# Patient Record
Sex: MALE | Race: WHITE | NOT HISPANIC OR LATINO | Employment: FULL TIME | URBAN - METROPOLITAN AREA
[De-identification: names, ages, dates, MRNs, and addresses within clinical notes are randomized per-mention and may not be internally consistent; named-entity substitution may affect disease eponyms.]

---

## 2018-02-21 ENCOUNTER — HISTORICAL (OUTPATIENT)
Dept: INTENSIVE CARE | Facility: HOSPITAL | Age: 28
End: 2018-02-21

## 2020-08-12 ENCOUNTER — HISTORICAL (OUTPATIENT)
Dept: ADMINISTRATIVE | Facility: HOSPITAL | Age: 30
End: 2020-08-12

## 2020-08-12 LAB
ABS NEUT (OLG): 2.41 X10(3)/MCL (ref 2.1–9.2)
ALBUMIN SERPL-MCNC: 3.7 GM/DL (ref 3.5–5)
ALBUMIN/GLOB SERPL: 1.5 RATIO (ref 1.1–2)
ALP SERPL-CCNC: 60 UNIT/L (ref 40–150)
ALT SERPL-CCNC: 23 UNIT/L (ref 0–55)
AST SERPL-CCNC: 21 UNIT/L (ref 5–34)
BASOPHILS # BLD AUTO: 0 X10(3)/MCL (ref 0–0.2)
BASOPHILS NFR BLD AUTO: 1 %
BILIRUB SERPL-MCNC: 0.8 MG/DL
BILIRUBIN DIRECT+TOT PNL SERPL-MCNC: 0.3 MG/DL (ref 0–0.5)
BILIRUBIN DIRECT+TOT PNL SERPL-MCNC: 0.5 MG/DL (ref 0–0.8)
BUN SERPL-MCNC: 14 MG/DL (ref 8.9–20.6)
CALCIUM SERPL-MCNC: 8.9 MG/DL (ref 8.4–10.2)
CHLORIDE SERPL-SCNC: 104 MMOL/L (ref 98–107)
CHOLEST SERPL-MCNC: 207 MG/DL
CHOLEST/HDLC SERPL: 4 {RATIO} (ref 0–5)
CO2 SERPL-SCNC: 26 MMOL/L (ref 22–29)
CREAT SERPL-MCNC: 0.87 MG/DL (ref 0.73–1.18)
EOSINOPHIL # BLD AUTO: 0.1 X10(3)/MCL (ref 0–0.9)
EOSINOPHIL NFR BLD AUTO: 1 %
ERYTHROCYTE [DISTWIDTH] IN BLOOD BY AUTOMATED COUNT: 12.8 % (ref 11.5–17)
EST. AVERAGE GLUCOSE BLD GHB EST-MCNC: 102.5 MG/DL
GLOBULIN SER-MCNC: 2.5 GM/DL (ref 2.4–3.5)
GLUCOSE SERPL-MCNC: 88 MG/DL (ref 74–100)
HBA1C MFR BLD: 5.2 %
HCT VFR BLD AUTO: 42.1 % (ref 42–52)
HDLC SERPL-MCNC: 51 MG/DL (ref 35–60)
HGB BLD-MCNC: 13.4 GM/DL (ref 14–18)
LDLC SERPL CALC-MCNC: 131 MG/DL (ref 50–140)
LYMPHOCYTES # BLD AUTO: 1.9 X10(3)/MCL (ref 0.6–4.6)
LYMPHOCYTES NFR BLD AUTO: 39 %
MCH RBC QN AUTO: 30 PG (ref 27–31)
MCHC RBC AUTO-ENTMCNC: 31.8 GM/DL (ref 33–36)
MCV RBC AUTO: 94.4 FL (ref 80–94)
MONOCYTES # BLD AUTO: 0.4 X10(3)/MCL (ref 0.1–1.3)
MONOCYTES NFR BLD AUTO: 9 %
NEUTROPHILS # BLD AUTO: 2.41 X10(3)/MCL (ref 2.1–9.2)
NEUTROPHILS NFR BLD AUTO: 50 %
PLATELET # BLD AUTO: 212 X10(3)/MCL (ref 130–400)
PMV BLD AUTO: 10.1 FL (ref 9.4–12.4)
POTASSIUM SERPL-SCNC: 4.1 MMOL/L (ref 3.5–5.1)
PROT SERPL-MCNC: 6.2 GM/DL (ref 6.4–8.3)
RBC # BLD AUTO: 4.46 X10(6)/MCL (ref 4.7–6.1)
SODIUM SERPL-SCNC: 140 MMOL/L (ref 136–145)
TRIGL SERPL-MCNC: 126 MG/DL (ref 34–140)
TSH SERPL-ACNC: 1.92 UIU/ML (ref 0.35–4.94)
VLDLC SERPL CALC-MCNC: 25 MG/DL
WBC # SPEC AUTO: 4.9 X10(3)/MCL (ref 4.5–11.5)

## 2022-03-07 ENCOUNTER — HISTORICAL (OUTPATIENT)
Dept: ADMINISTRATIVE | Facility: HOSPITAL | Age: 32
End: 2022-03-07

## 2022-03-07 LAB
ABS NEUT (OLG): 2.89 (ref 2.1–9.2)
ALBUMIN SERPL-MCNC: 4.4 G/DL (ref 3.5–5)
ALBUMIN/GLOB SERPL: 1.6 {RATIO} (ref 1.1–2)
ALP SERPL-CCNC: 89 U/L (ref 40–150)
ALT SERPL-CCNC: 26 U/L (ref 0–55)
AST SERPL-CCNC: 24 U/L (ref 5–34)
BASOPHILS # BLD AUTO: 0 10*3/UL (ref 0–0.2)
BASOPHILS NFR BLD AUTO: 0 %
BILIRUB SERPL-MCNC: 1 MG/DL
BILIRUBIN DIRECT+TOT PNL SERPL-MCNC: 0.3 (ref 0–0.5)
BILIRUBIN DIRECT+TOT PNL SERPL-MCNC: 0.7 (ref 0–0.8)
BUN SERPL-MCNC: 12.1 MG/DL (ref 8.9–20.6)
CALCIUM SERPL-MCNC: 9.4 MG/DL (ref 8.7–10.5)
CHLORIDE SERPL-SCNC: 108 MMOL/L (ref 98–107)
CHOLEST SERPL-MCNC: 214 MG/DL
CHOLEST/HDLC SERPL: 4 {RATIO} (ref 0–5)
CO2 SERPL-SCNC: 24 MMOL/L (ref 22–29)
CREAT SERPL-MCNC: 0.88 MG/DL (ref 0.73–1.18)
DEPRECATED CALCIDIOL+CALCIFEROL SERPL-MC: 34.6 NG/ML (ref 30–80)
EOSINOPHIL # BLD AUTO: 0 10*3/UL (ref 0–0.9)
EOSINOPHIL NFR BLD AUTO: 1 %
ERYTHROCYTE [DISTWIDTH] IN BLOOD BY AUTOMATED COUNT: 13.3 % (ref 11.5–17)
EST. AVERAGE GLUCOSE BLD GHB EST-MCNC: 102.5 MG/DL
GLOBULIN SER-MCNC: 2.7 G/DL (ref 2.4–3.5)
GLUCOSE SERPL-MCNC: 91 MG/DL (ref 74–100)
HBA1C MFR BLD: 5.2 %
HCT VFR BLD AUTO: 42.9 % (ref 42–52)
HDLC SERPL-MCNC: 57 MG/DL (ref 35–60)
HEMOLYSIS INTERF INDEX SERPL-ACNC: 9
HGB BLD-MCNC: 14 G/DL (ref 14–18)
ICTERIC INTERF INDEX SERPL-ACNC: 1
LDLC SERPL CALC-MCNC: 140 MG/DL (ref 50–140)
LIPEMIC INTERF INDEX SERPL-ACNC: 2
LYMPHOCYTES # BLD AUTO: 1.5 10*3/UL (ref 0.6–4.6)
LYMPHOCYTES NFR BLD AUTO: 31 %
MANUAL DIFF? (OHS): NO
MCH RBC QN AUTO: 30.6 PG (ref 27–31)
MCHC RBC AUTO-ENTMCNC: 32.6 G/DL (ref 33–36)
MCV RBC AUTO: 93.7 FL (ref 80–94)
MONOCYTES # BLD AUTO: 0.4 10*3/UL (ref 0.1–1.3)
MONOCYTES NFR BLD AUTO: 9 %
NEUTROPHILS # BLD AUTO: 2.89 10*3/UL (ref 2.1–9.2)
NEUTROPHILS NFR BLD AUTO: 59 %
PLATELET # BLD AUTO: 251 10*3/UL (ref 130–400)
PMV BLD AUTO: 10.4 FL (ref 9.4–12.4)
POTASSIUM SERPL-SCNC: 4.5 MMOL/L (ref 3.5–5.1)
PROT SERPL-MCNC: 7.1 G/DL (ref 6.4–8.3)
RBC # BLD AUTO: 4.58 10*6/UL (ref 4.7–6.1)
SODIUM SERPL-SCNC: 140 MMOL/L (ref 136–145)
TRIGL SERPL-MCNC: 84 MG/DL (ref 34–140)
TSH SERPL-ACNC: 1.38 M[IU]/L (ref 0.35–4.94)
VLDLC SERPL CALC-MCNC: 17 MG/DL
WBC # SPEC AUTO: 4.9 10*3/UL (ref 4.5–11.5)

## 2022-04-10 ENCOUNTER — HISTORICAL (OUTPATIENT)
Dept: ADMINISTRATIVE | Facility: HOSPITAL | Age: 32
End: 2022-04-10

## 2022-04-29 VITALS
SYSTOLIC BLOOD PRESSURE: 124 MMHG | WEIGHT: 299.19 LBS | DIASTOLIC BLOOD PRESSURE: 72 MMHG | HEIGHT: 71 IN | OXYGEN SATURATION: 98 % | BODY MASS INDEX: 41.89 KG/M2

## 2022-07-28 ENCOUNTER — OFFICE VISIT (OUTPATIENT)
Dept: INTERNAL MEDICINE | Facility: CLINIC | Age: 32
End: 2022-07-28
Payer: COMMERCIAL

## 2022-07-28 VITALS
BODY MASS INDEX: 37.56 KG/M2 | TEMPERATURE: 98 F | DIASTOLIC BLOOD PRESSURE: 74 MMHG | HEART RATE: 60 BPM | OXYGEN SATURATION: 97 % | WEIGHT: 262.38 LBS | HEIGHT: 70 IN | SYSTOLIC BLOOD PRESSURE: 120 MMHG

## 2022-07-28 DIAGNOSIS — S68.119S TRAUMATIC AMPUTATION OF FINGER, SEQUELA: ICD-10-CM

## 2022-07-28 DIAGNOSIS — E66.01 MORBID OBESITY: ICD-10-CM

## 2022-07-28 DIAGNOSIS — F41.8 MIXED ANXIETY DEPRESSIVE DISORDER: Primary | ICD-10-CM

## 2022-07-28 PROCEDURE — 1160F RVW MEDS BY RX/DR IN RCRD: CPT | Mod: CPTII,,, | Performed by: INTERNAL MEDICINE

## 2022-07-28 PROCEDURE — 3008F PR BODY MASS INDEX (BMI) DOCUMENTED: ICD-10-PCS | Mod: CPTII,,, | Performed by: INTERNAL MEDICINE

## 2022-07-28 PROCEDURE — 3078F DIAST BP <80 MM HG: CPT | Mod: CPTII,,, | Performed by: INTERNAL MEDICINE

## 2022-07-28 PROCEDURE — 99214 OFFICE O/P EST MOD 30 MIN: CPT | Mod: ,,, | Performed by: INTERNAL MEDICINE

## 2022-07-28 PROCEDURE — 1159F MED LIST DOCD IN RCRD: CPT | Mod: CPTII,,, | Performed by: INTERNAL MEDICINE

## 2022-07-28 PROCEDURE — 3008F BODY MASS INDEX DOCD: CPT | Mod: CPTII,,, | Performed by: INTERNAL MEDICINE

## 2022-07-28 PROCEDURE — 3074F SYST BP LT 130 MM HG: CPT | Mod: CPTII,,, | Performed by: INTERNAL MEDICINE

## 2022-07-28 PROCEDURE — 3074F PR MOST RECENT SYSTOLIC BLOOD PRESSURE < 130 MM HG: ICD-10-PCS | Mod: CPTII,,, | Performed by: INTERNAL MEDICINE

## 2022-07-28 PROCEDURE — 1160F PR REVIEW ALL MEDS BY PRESCRIBER/CLIN PHARMACIST DOCUMENTED: ICD-10-PCS | Mod: CPTII,,, | Performed by: INTERNAL MEDICINE

## 2022-07-28 PROCEDURE — 1159F PR MEDICATION LIST DOCUMENTED IN MEDICAL RECORD: ICD-10-PCS | Mod: CPTII,,, | Performed by: INTERNAL MEDICINE

## 2022-07-28 PROCEDURE — 99214 PR OFFICE/OUTPT VISIT, EST, LEVL IV, 30-39 MIN: ICD-10-PCS | Mod: ,,, | Performed by: INTERNAL MEDICINE

## 2022-07-28 PROCEDURE — 3078F PR MOST RECENT DIASTOLIC BLOOD PRESSURE < 80 MM HG: ICD-10-PCS | Mod: CPTII,,, | Performed by: INTERNAL MEDICINE

## 2022-07-28 RX ORDER — BUPROPION HYDROCHLORIDE 150 MG/1
150 TABLET ORAL DAILY
Qty: 30 TABLET | Refills: 11 | Status: SHIPPED | OUTPATIENT
Start: 2022-07-28 | End: 2022-07-28 | Stop reason: SDUPTHER

## 2022-07-28 RX ORDER — PROPRANOLOL HYDROCHLORIDE 10 MG/1
20 TABLET ORAL 3 TIMES DAILY PRN
Qty: 180 TABLET | Refills: 3 | Status: SHIPPED | OUTPATIENT
Start: 2022-07-28 | End: 2022-07-28 | Stop reason: SDUPTHER

## 2022-07-28 NOTE — ASSESSMENT & PLAN NOTE
-advised on importance of watching her carbohydrate intake and sugary food intake  -some form of hurting her exercise is encouraged at length  -hydration is emphasized

## 2022-07-28 NOTE — PROGRESS NOTES
Subjective:      Patient ID: Jose Simon is a 31 y.o. male.    Chief Complaint: Anxiety and Loss of Consciousness    Jose is a 31-year-old male here today with complaints of anxiety.     Medical comorbidities include left hand digit amputations, anxiety/depression, tobacco use, and obesity.  At our last visit advised to stop using energy drinks and the workup drains which are high in caffeine because of issues with palpitations.   Today he requested a visit since anxiety was getting uncontrolled.  Patient has been having difficulty falling asleep as well.  States he probably needs something to keep his anxiety under control and we discussed getting him started on Wellbutrin and he is willing to try.  Also propranolol to help with p.r.n. use    Wellness: 3/7/2022    The patient's Health Maintenance was reviewed and the following appears to be due at this time:   There are no preventive care reminders to display for this patient.     Past Medical History:  Past Medical History:   Diagnosis Date    Anxiety and depression     Fatigue     Snoring     Weight gain      Past Surgical History:   Procedure Laterality Date    left hand scar tissue revision      REVISION AMPUTATION OF FINGER      TONSILLECTOMY AND ADENOIDECTOMY      WISDOM TOOTH EXTRACTION       Review of patient's allergies indicates:   Allergen Reactions    Oxycodone-acetaminophen Itching     Social History     Socioeconomic History    Marital status: Single   Tobacco Use    Smoking status: Former Smoker    Smokeless tobacco: Current User     Types: Chew   Substance and Sexual Activity    Alcohol use: Yes     Alcohol/week: 4.0 standard drinks     Types: 4 Cans of beer per week    Drug use: Never    Sexual activity: Yes     Family History   Problem Relation Age of Onset    Depression Mother     Anxiety disorder Mother     Anxiety disorder Father     Cancer Maternal Grandmother     Cancer Paternal Grandfather        Review of Systems  "  Constitutional: Negative.         Alert, Oriented and in no acute distress    HENT: Negative for congestion, rhinorrhea, sinus pressure, sinus pain, sore throat and voice change.    Eyes: Negative for discharge and visual disturbance.   Respiratory: Negative for cough, chest tightness, shortness of breath and wheezing.    Cardiovascular: Negative for chest pain, palpitations and leg swelling.   Gastrointestinal: Negative for abdominal distention, abdominal pain, constipation, diarrhea, nausea and vomiting.   Endocrine: Negative for cold intolerance, heat intolerance, polydipsia and polyuria.   Genitourinary: Negative for dysuria, flank pain, frequency and hematuria.   Musculoskeletal: Negative for arthralgias, back pain, gait problem, joint swelling, myalgias and neck pain.   Skin: Negative for rash.   Allergic/Immunologic: Negative for immunocompromised state.   Neurological: Negative for dizziness, seizures, weakness and headaches.   Psychiatric/Behavioral: Negative for agitation and suicidal ideas. The patient is not nervous/anxious.        Objective:   /74 (BP Location: Left arm, Patient Position: Sitting, BP Method: Large (Manual))   Pulse 60   Temp 98.3 °F (36.8 °C) (Temporal)   Ht 5' 10" (1.778 m)   Wt 119 kg (262 lb 6.4 oz)   SpO2 97%   BMI 37.65 kg/m²     Physical Exam  Constitutional:       Appearance: Normal appearance.   HENT:      Head: Normocephalic and atraumatic.      Nose: Nose normal.      Mouth/Throat:      Mouth: Mucous membranes are moist.      Pharynx: Oropharynx is clear.   Eyes:      Extraocular Movements: Extraocular movements intact.      Pupils: Pupils are equal, round, and reactive to light.   Cardiovascular:      Rate and Rhythm: Normal rate and regular rhythm.      Pulses: Normal pulses.   Pulmonary:      Effort: Pulmonary effort is normal.      Breath sounds: Normal breath sounds.   Abdominal:      General: Bowel sounds are normal.      Palpations: Abdomen is soft. "   Musculoskeletal:         General: Normal range of motion.      Cervical back: Normal range of motion and neck supple.      Comments: Amputated fingers on the left   Skin:     General: Skin is warm.   Neurological:      General: No focal deficit present.      Mental Status: He is alert and oriented to person, place, and time. Mental status is at baseline.   Psychiatric:         Mood and Affect: Mood normal.       Assessment/ Plan:     Problem List Items Addressed This Visit        Psychiatric    Mixed anxiety depressive disorder - Primary     -initiating Wellbutrin 150 mg p.o. daily  -also propranolol 20 mg t.i.d. p.r.n. for anxiety episodes  -advised to avoid excessive caffeine intake  -RTC 3 months to see if he does well, advised on side effect profile and the fact that he needs to get the medication at least 2-3 weeks to see some difference              Endocrine    Morbid obesity     -advised on importance of watching her carbohydrate intake and sugary food intake  -some form of hurting her exercise is encouraged at length  -hydration is emphasized              Orthopedic    Amputation, finger, traumatic          No orders of the defined types were placed in this encounter.

## 2022-07-28 NOTE — ASSESSMENT & PLAN NOTE
-initiating Wellbutrin 150 mg p.o. daily  -also propranolol 20 mg t.i.d. p.r.n. for anxiety episodes  -advised to avoid excessive caffeine intake  -RTC 3 months to see if he does well, advised on side effect profile and the fact that he needs to get the medication at least 2-3 weeks to see some difference

## 2022-10-26 ENCOUNTER — TELEPHONE (OUTPATIENT)
Dept: ADMINISTRATIVE | Facility: HOSPITAL | Age: 32
End: 2022-10-26
Payer: COMMERCIAL

## 2022-11-01 ENCOUNTER — OFFICE VISIT (OUTPATIENT)
Dept: INTERNAL MEDICINE | Facility: CLINIC | Age: 32
End: 2022-11-01
Payer: COMMERCIAL

## 2022-11-01 VITALS
OXYGEN SATURATION: 97 % | SYSTOLIC BLOOD PRESSURE: 120 MMHG | TEMPERATURE: 99 F | HEART RATE: 65 BPM | DIASTOLIC BLOOD PRESSURE: 68 MMHG

## 2022-11-01 DIAGNOSIS — F32.A ANXIETY AND DEPRESSION: Primary | ICD-10-CM

## 2022-11-01 DIAGNOSIS — F41.9 ANXIETY AND DEPRESSION: Primary | ICD-10-CM

## 2022-11-01 DIAGNOSIS — E66.01 MORBID OBESITY: ICD-10-CM

## 2022-11-01 PROCEDURE — 99213 OFFICE O/P EST LOW 20 MIN: CPT | Mod: ,,, | Performed by: INTERNAL MEDICINE

## 2022-11-01 PROCEDURE — 3078F DIAST BP <80 MM HG: CPT | Mod: CPTII,,, | Performed by: INTERNAL MEDICINE

## 2022-11-01 PROCEDURE — 1159F MED LIST DOCD IN RCRD: CPT | Mod: CPTII,,, | Performed by: INTERNAL MEDICINE

## 2022-11-01 PROCEDURE — 3074F PR MOST RECENT SYSTOLIC BLOOD PRESSURE < 130 MM HG: ICD-10-PCS | Mod: CPTII,,, | Performed by: INTERNAL MEDICINE

## 2022-11-01 PROCEDURE — 1160F RVW MEDS BY RX/DR IN RCRD: CPT | Mod: CPTII,,, | Performed by: INTERNAL MEDICINE

## 2022-11-01 PROCEDURE — 99213 PR OFFICE/OUTPT VISIT, EST, LEVL III, 20-29 MIN: ICD-10-PCS | Mod: ,,, | Performed by: INTERNAL MEDICINE

## 2022-11-01 PROCEDURE — 1160F PR REVIEW ALL MEDS BY PRESCRIBER/CLIN PHARMACIST DOCUMENTED: ICD-10-PCS | Mod: CPTII,,, | Performed by: INTERNAL MEDICINE

## 2022-11-01 PROCEDURE — 3078F PR MOST RECENT DIASTOLIC BLOOD PRESSURE < 80 MM HG: ICD-10-PCS | Mod: CPTII,,, | Performed by: INTERNAL MEDICINE

## 2022-11-01 PROCEDURE — 1159F PR MEDICATION LIST DOCUMENTED IN MEDICAL RECORD: ICD-10-PCS | Mod: CPTII,,, | Performed by: INTERNAL MEDICINE

## 2022-11-01 PROCEDURE — 3074F SYST BP LT 130 MM HG: CPT | Mod: CPTII,,, | Performed by: INTERNAL MEDICINE

## 2022-11-01 NOTE — ASSESSMENT & PLAN NOTE
Doing better with the Rx Wellbutrin, continue, may consider reducing the dose in the future  Also on Rx propranolol on as needed basis, continue     Practice deep breathing or abdominal breathing exercises when anxiety occurs.  Exercise daily. Get sunlight daily.  Avoid caffeine, alcohol and stimulants.  Practice positive phrases and repeat throughout the day, along with yoga and relaxation techniques.  Set healthy boundaries, avoid people and conversations that increase stress.

## 2022-11-01 NOTE — PROGRESS NOTES
Subjective:      Patient ID: Jose Simon is a 31 y.o. male.    Chief Complaint: Follow-up (3 month)    Jose is a 31-year-old male here today for follow-up of anxiety.     Medical comorbidities include left hand digit amputations, anxiety/depression, tobacco use, and obesity.    Doing well with the Wellbutrin, sometimes skips doses and takes it on an as needed basis, advised that it needs to be taken daily.  If it is too strong, offered to cut back the dose.  For now patient will switch it to the nighttime and see how he does.  Uses propranolol in an as needed basis.          Wellness: 3/7/2022    The patient's Health Maintenance was reviewed and the following appears to be due at this time:   Health Maintenance Due   Topic Date Due    Influenza Vaccine (1) Never done        Past Medical History:  Past Medical History:   Diagnosis Date    Anxiety and depression     Fatigue     Snoring     Weight gain      Past Surgical History:   Procedure Laterality Date    left hand scar tissue revision      REVISION AMPUTATION OF FINGER      TONSILLECTOMY AND ADENOIDECTOMY      WISDOM TOOTH EXTRACTION       Review of patient's allergies indicates:   Allergen Reactions    Oxycodone-acetaminophen Itching     Social History     Socioeconomic History    Marital status: Single   Tobacco Use    Smoking status: Former    Smokeless tobacco: Current     Types: Chew   Substance and Sexual Activity    Alcohol use: Yes     Alcohol/week: 4.0 standard drinks     Types: 4 Drinks containing 0.5 oz of alcohol per week    Drug use: Not Currently     Types: Amphetamines, Marijuana    Sexual activity: Yes     Partners: Female     Family History   Problem Relation Age of Onset    Depression Mother     Anxiety disorder Mother     Anxiety disorder Father     Cancer Maternal Grandmother     Cancer Paternal Grandfather        Review of Systems    A comprehensive review of systems was performed and is negative except for that stated above  Objective:  "  /68 (BP Location: Left arm, Patient Position: Sitting, BP Method: Large (Manual))   Pulse 65   Temp 98.6 °F (37 °C) (Temporal)   Ht (P) 5' 10" (1.778 m)   Wt (P) 118.6 kg (261 lb 6.4 oz)   SpO2 97%   BMI (P) 37.51 kg/m²     Physical Exam  Constitutional:       Appearance: Normal appearance.   HENT:      Head: Normocephalic and atraumatic.      Nose: Nose normal.      Mouth/Throat:      Mouth: Mucous membranes are moist.      Pharynx: Oropharynx is clear.   Eyes:      Extraocular Movements: Extraocular movements intact.      Pupils: Pupils are equal, round, and reactive to light.   Cardiovascular:      Rate and Rhythm: Normal rate and regular rhythm.      Pulses: Normal pulses.   Pulmonary:      Effort: Pulmonary effort is normal.      Breath sounds: Normal breath sounds.   Abdominal:      General: Bowel sounds are normal.      Palpations: Abdomen is soft.   Musculoskeletal:         General: Normal range of motion.      Cervical back: Normal range of motion and neck supple.   Skin:     General: Skin is warm.   Neurological:      General: No focal deficit present.      Mental Status: He is alert and oriented to person, place, and time. Mental status is at baseline.   Psychiatric:         Mood and Affect: Mood normal.     Assessment/ Plan:   1. Anxiety and depression  Assessment & Plan:  Doing better with the Rx Wellbutrin, continue, may consider reducing the dose in the future  Also on Rx propranolol on as needed basis, continue     Practice deep breathing or abdominal breathing exercises when anxiety occurs.  Exercise daily. Get sunlight daily.  Avoid caffeine, alcohol and stimulants.  Practice positive phrases and repeat throughout the day, along with yoga and relaxation techniques.  Set healthy boundaries, avoid people and conversations that increase stress.        2. Morbid obesity  Assessment & Plan:  Body mass index is 37.51 kg/m² (pended).  Goal BMI <30.  Exercise 5 times a week for 30 minutes per " day.  Avoid soda, simple sugars, excessive rice, potatoes or bread. Limit fast foods and fried foods.  Choose complex carbs in moderation (example: green vegetables, beans, oatmeal). Eat plenty of fresh fruits and vegetables with lean meats daily.  Do not skip meals. Eat a balanced portion size.  Avoid fad diets. Consider permanent healthy life style changes.

## 2022-11-01 NOTE — ASSESSMENT & PLAN NOTE
Body mass index is 37.51 kg/m² (pended).  Goal BMI <30.  Exercise 5 times a week for 30 minutes per day.  Avoid soda, simple sugars, excessive rice, potatoes or bread. Limit fast foods and fried foods.  Choose complex carbs in moderation (example: green vegetables, beans, oatmeal). Eat plenty of fresh fruits and vegetables with lean meats daily.  Do not skip meals. Eat a balanced portion size.  Avoid fad diets. Consider permanent healthy life style changes.

## 2023-03-07 DIAGNOSIS — R53.83 FATIGUE, UNSPECIFIED TYPE: ICD-10-CM

## 2023-03-07 DIAGNOSIS — Z00.00 WELLNESS EXAMINATION: Primary | ICD-10-CM

## 2023-03-09 ENCOUNTER — TELEPHONE (OUTPATIENT)
Dept: ADMINISTRATIVE | Facility: HOSPITAL | Age: 33
End: 2023-03-09
Payer: COMMERCIAL

## 2023-03-09 NOTE — TELEPHONE ENCOUNTER
----- Message from Michelle Woo MA sent at 3/7/2023  7:43 AM CST -----  Regarding: Dr SHAHID EJAN Thursday 3-16-23       1. Are there any outstanding Labs, imaging or referrals in the patient's chart?      Wellness Appointment    Fasting wellness labs ordered and ready to do.     Last Wellness 3-7-22           2. . Has the patient been seen in an ER, urgent care clinic, or any other health care    provider since their last visit? If yes when and where?

## 2023-03-15 ENCOUNTER — TELEPHONE (OUTPATIENT)
Dept: INTERNAL MEDICINE | Facility: CLINIC | Age: 33
End: 2023-03-15
Payer: COMMERCIAL

## 2023-03-20 ENCOUNTER — LAB VISIT (OUTPATIENT)
Dept: LAB | Facility: HOSPITAL | Age: 33
End: 2023-03-20
Attending: INTERNAL MEDICINE
Payer: COMMERCIAL

## 2023-03-20 DIAGNOSIS — R53.83 FATIGUE, UNSPECIFIED TYPE: ICD-10-CM

## 2023-03-20 DIAGNOSIS — Z00.00 WELLNESS EXAMINATION: ICD-10-CM

## 2023-03-20 LAB
ALBUMIN SERPL-MCNC: 4.2 G/DL (ref 3.5–5)
ALBUMIN/GLOB SERPL: 1.6 RATIO (ref 1.1–2)
ALP SERPL-CCNC: 55 UNIT/L (ref 40–150)
ALT SERPL-CCNC: 18 UNIT/L (ref 0–55)
AST SERPL-CCNC: 20 UNIT/L (ref 5–34)
BASOPHILS # BLD AUTO: 0.03 X10(3)/MCL (ref 0–0.2)
BASOPHILS NFR BLD AUTO: 0.7 %
BILIRUBIN DIRECT+TOT PNL SERPL-MCNC: 0.7 MG/DL
BUN SERPL-MCNC: 14.1 MG/DL (ref 8.9–20.6)
CALCIUM SERPL-MCNC: 9.6 MG/DL (ref 8.4–10.2)
CHLORIDE SERPL-SCNC: 107 MMOL/L (ref 98–107)
CHOLEST SERPL-MCNC: 216 MG/DL
CHOLEST/HDLC SERPL: 4 {RATIO} (ref 0–5)
CO2 SERPL-SCNC: 25 MMOL/L (ref 22–29)
CREAT SERPL-MCNC: 0.93 MG/DL (ref 0.73–1.18)
DEPRECATED CALCIDIOL+CALCIFEROL SERPL-MC: 37 NG/ML (ref 30–80)
EOSINOPHIL # BLD AUTO: 0.05 X10(3)/MCL (ref 0–0.9)
EOSINOPHIL NFR BLD AUTO: 1.1 %
ERYTHROCYTE [DISTWIDTH] IN BLOOD BY AUTOMATED COUNT: 12.8 % (ref 11.5–17)
EST. AVERAGE GLUCOSE BLD GHB EST-MCNC: 102.5 MG/DL
GFR SERPLBLD CREATININE-BSD FMLA CKD-EPI: >60 MLS/MIN/1.73/M2
GLOBULIN SER-MCNC: 2.6 GM/DL (ref 2.4–3.5)
GLUCOSE SERPL-MCNC: 97 MG/DL (ref 74–100)
HBA1C MFR BLD: 5.2 %
HCT VFR BLD AUTO: 42.8 % (ref 42–52)
HDLC SERPL-MCNC: 60 MG/DL (ref 35–60)
HGB BLD-MCNC: 13.8 G/DL (ref 14–18)
IMM GRANULOCYTES # BLD AUTO: 0.01 X10(3)/MCL (ref 0–0.04)
IMM GRANULOCYTES NFR BLD AUTO: 0.2 %
LDLC SERPL CALC-MCNC: 145 MG/DL (ref 50–140)
LYMPHOCYTES # BLD AUTO: 1.64 X10(3)/MCL (ref 0.6–4.6)
LYMPHOCYTES NFR BLD AUTO: 37.3 %
MCH RBC QN AUTO: 30.4 PG
MCHC RBC AUTO-ENTMCNC: 32.2 G/DL (ref 33–36)
MCV RBC AUTO: 94.3 FL (ref 80–94)
MONOCYTES # BLD AUTO: 0.43 X10(3)/MCL (ref 0.1–1.3)
MONOCYTES NFR BLD AUTO: 9.8 %
NEUTROPHILS # BLD AUTO: 2.24 X10(3)/MCL (ref 2.1–9.2)
NEUTROPHILS NFR BLD AUTO: 50.9 %
NRBC BLD AUTO-RTO: 0 %
PLATELET # BLD AUTO: 238 X10(3)/MCL (ref 130–400)
PMV BLD AUTO: 9.8 FL (ref 7.4–10.4)
POTASSIUM SERPL-SCNC: 4.5 MMOL/L (ref 3.5–5.1)
PROT SERPL-MCNC: 6.8 GM/DL (ref 6.4–8.3)
RBC # BLD AUTO: 4.54 X10(6)/MCL (ref 4.7–6.1)
SODIUM SERPL-SCNC: 141 MMOL/L (ref 136–145)
TRIGL SERPL-MCNC: 57 MG/DL (ref 34–140)
TSH SERPL-ACNC: 1.36 UIU/ML (ref 0.35–4.94)
VLDLC SERPL CALC-MCNC: 11 MG/DL
WBC # SPEC AUTO: 4.4 X10(3)/MCL (ref 4.5–11.5)

## 2023-03-20 PROCEDURE — 83036 HEMOGLOBIN GLYCOSYLATED A1C: CPT

## 2023-03-20 PROCEDURE — 36415 COLL VENOUS BLD VENIPUNCTURE: CPT

## 2023-03-20 PROCEDURE — 80061 LIPID PANEL: CPT

## 2023-03-20 PROCEDURE — 82306 VITAMIN D 25 HYDROXY: CPT

## 2023-03-20 PROCEDURE — 84443 ASSAY THYROID STIM HORMONE: CPT

## 2023-03-20 PROCEDURE — 85025 COMPLETE CBC W/AUTO DIFF WBC: CPT

## 2023-03-20 PROCEDURE — 80053 COMPREHEN METABOLIC PANEL: CPT

## 2023-03-22 ENCOUNTER — OFFICE VISIT (OUTPATIENT)
Dept: INTERNAL MEDICINE | Facility: CLINIC | Age: 33
End: 2023-03-22
Payer: COMMERCIAL

## 2023-03-22 VITALS
BODY MASS INDEX: 37.08 KG/M2 | DIASTOLIC BLOOD PRESSURE: 82 MMHG | RESPIRATION RATE: 18 BRPM | TEMPERATURE: 98 F | OXYGEN SATURATION: 96 % | SYSTOLIC BLOOD PRESSURE: 118 MMHG | WEIGHT: 258.38 LBS | HEART RATE: 62 BPM

## 2023-03-22 DIAGNOSIS — F41.9 ANXIETY AND DEPRESSION: Chronic | ICD-10-CM

## 2023-03-22 DIAGNOSIS — Z00.00 WELLNESS EXAMINATION: Primary | ICD-10-CM

## 2023-03-22 DIAGNOSIS — E78.2 MIXED HYPERLIPIDEMIA: Chronic | ICD-10-CM

## 2023-03-22 DIAGNOSIS — S68.119D TRAUMATIC AMPUTATION OF FINGER, SUBSEQUENT ENCOUNTER: Chronic | ICD-10-CM

## 2023-03-22 DIAGNOSIS — E66.09 CLASS 2 OBESITY DUE TO EXCESS CALORIES WITHOUT SERIOUS COMORBIDITY WITH BODY MASS INDEX (BMI) OF 37.0 TO 37.9 IN ADULT: Chronic | ICD-10-CM

## 2023-03-22 DIAGNOSIS — F32.A ANXIETY AND DEPRESSION: Chronic | ICD-10-CM

## 2023-03-22 PROCEDURE — 1160F RVW MEDS BY RX/DR IN RCRD: CPT | Mod: CPTII,,,

## 2023-03-22 PROCEDURE — 99395 PREV VISIT EST AGE 18-39: CPT | Mod: ,,,

## 2023-03-22 PROCEDURE — 3008F PR BODY MASS INDEX (BMI) DOCUMENTED: ICD-10-PCS | Mod: CPTII,,,

## 2023-03-22 PROCEDURE — 3074F PR MOST RECENT SYSTOLIC BLOOD PRESSURE < 130 MM HG: ICD-10-PCS | Mod: CPTII,,,

## 2023-03-22 PROCEDURE — 99395 PR PREVENTIVE VISIT,EST,18-39: ICD-10-PCS | Mod: ,,,

## 2023-03-22 PROCEDURE — 3079F DIAST BP 80-89 MM HG: CPT | Mod: CPTII,,,

## 2023-03-22 PROCEDURE — 3079F PR MOST RECENT DIASTOLIC BLOOD PRESSURE 80-89 MM HG: ICD-10-PCS | Mod: CPTII,,,

## 2023-03-22 PROCEDURE — 3008F BODY MASS INDEX DOCD: CPT | Mod: CPTII,,,

## 2023-03-22 PROCEDURE — 1159F PR MEDICATION LIST DOCUMENTED IN MEDICAL RECORD: ICD-10-PCS | Mod: CPTII,,,

## 2023-03-22 PROCEDURE — 1160F PR REVIEW ALL MEDS BY PRESCRIBER/CLIN PHARMACIST DOCUMENTED: ICD-10-PCS | Mod: CPTII,,,

## 2023-03-22 PROCEDURE — 3074F SYST BP LT 130 MM HG: CPT | Mod: CPTII,,,

## 2023-03-22 PROCEDURE — 1159F MED LIST DOCD IN RCRD: CPT | Mod: CPTII,,,

## 2023-03-22 NOTE — PROGRESS NOTES
"    Patient ID: Jose Simon is a 32 y.o. male.    Chief Complaint: Annual Exam (Pt doing well today no questions or concerns )     32-year-old male here today for a wellness visit.  Medical comorbidities include left hand digit amputations, anxiety/depression,  elevated cholesterol, and obesity.     Since last visit patient reports he has been fairly well without acute injury or major illness.  Does report he has been trying to take Wellbutrin routinely, for which compliance was encouraged.  Does state occasionally feeling slightly "blunted" feeling due to medications, for which we discussed potentially switching to alternative anxiety/depression medication.  Patient declined for now and wishing to further evaluate if persistent.  Wellness labs reviewed and generally stable.  Noted elevated lipid profile, for which she was encouraged continued lifestyle modification.  Today patient noted 258 lb, improved from prior to 299 lb.    Immunizations reviewed and up-to-date.  Age-appropriate screenings reviewed and up-to-date.Otherwise patient fairly stable without any other acute complaints.    Wellness: Today 03/22/2023      MEDICAL HISTORY:    Past Medical History:   Diagnosis Date    Amputation, finger, traumatic 10/22/2014    Anxiety and depression     Class 2 obesity due to excess calories without serious comorbidity in adult 7/28/2022    Fatigue     Mixed hyperlipidemia 3/22/2023      Past Surgical History:   Procedure Laterality Date    left hand scar tissue revision      REVISION AMPUTATION OF FINGER      TONSILLECTOMY AND ADENOIDECTOMY      WISDOM TOOTH EXTRACTION        Social History     Tobacco Use    Smoking status: Former    Smokeless tobacco: Current     Types: Chew   Substance Use Topics    Alcohol use: Yes     Alcohol/week: 4.0 standard drinks     Types: 4 Drinks containing 0.5 oz of alcohol per week    Drug use: Not Currently     Types: Amphetamines, Marijuana          Health Maintenance Due   Topic " Date Due    Hepatitis C Screening  Never done          Patient Care Team:  Iqra Guerra MD as PCP - General (Internal Medicine)      Review of Systems   Constitutional:  Negative for fatigue and fever.   HENT:  Negative for congestion, rhinorrhea, sore throat and trouble swallowing.    Eyes:  Negative for redness and visual disturbance.   Respiratory:  Negative for cough, chest tightness and shortness of breath.    Cardiovascular:  Negative for chest pain and palpitations.   Gastrointestinal:  Negative for abdominal pain, constipation, diarrhea, nausea and vomiting.   Genitourinary:  Negative for dysuria, flank pain, frequency and urgency.   Musculoskeletal:  Negative for arthralgias, gait problem and myalgias.   Skin:  Negative for rash and wound.   Neurological:  Negative for facial asymmetry, speech difficulty, weakness and headaches.   All other systems reviewed and are negative.    Objective:   /82 (BP Location: Right arm, Patient Position: Sitting, BP Method: Large (Automatic))   Pulse 62   Temp 97.5 °F (36.4 °C) (Temporal)   Resp 18   Wt 117.2 kg (258 lb 6.4 oz)   SpO2 96%   BMI (P) 37.08 kg/m²      Physical Exam  Constitutional:       General: He is not in acute distress.     Appearance: Normal appearance. He is obese.   HENT:      Right Ear: Tympanic membrane, ear canal and external ear normal.      Left Ear: Tympanic membrane, ear canal and external ear normal.      Nose: Nose normal.      Mouth/Throat:      Mouth: Mucous membranes are moist.      Pharynx: Oropharynx is clear.   Eyes:      Extraocular Movements: Extraocular movements intact.      Conjunctiva/sclera: Conjunctivae normal.      Pupils: Pupils are equal, round, and reactive to light.   Cardiovascular:      Rate and Rhythm: Normal rate and regular rhythm.      Pulses: Normal pulses.      Heart sounds: Normal heart sounds. No murmur heard.    No gallop.   Pulmonary:      Effort: Pulmonary effort is normal.      Breath sounds:  Normal breath sounds. No wheezing.   Abdominal:      General: Bowel sounds are normal. There is no distension.      Palpations: Abdomen is soft. There is no mass.      Tenderness: There is no abdominal tenderness. There is no guarding.   Musculoskeletal:         General: Normal range of motion.   Skin:     General: Skin is warm and dry.   Neurological:      Mental Status: He is alert. Mental status is at baseline.      Sensory: No sensory deficit.      Motor: No weakness.         Assessment:       ICD-10-CM ICD-9-CM   1. Wellness examination  Z00.00 V70.0   2. Mixed hyperlipidemia  E78.2 272.2   3. Class 2 obesity due to excess calories without serious comorbidity with body mass index (BMI) of 37.0 to 37.9 in adult  E66.09 278.00    Z68.37 V85.37   4. Anxiety and depression  F41.9 300.00    F32.A 311   5. Traumatic amputation of finger, subsequent encounter  S68.119D V54.89        Plan:     Problem List Items Addressed This Visit          Psychiatric    Anxiety and depression (Chronic)     - -currently on Wellbutrin, continue  -Also on propranolol doing well         Relevant Orders    TSH    Lipid Panel    Comprehensive Metabolic Panel    CBC Auto Differential    Vitamin D    Hemoglobin A1C       Cardiac/Vascular    Mixed hyperlipidemia (Chronic)     Lab Results   Component Value Date    CHOL 216 (H) 03/20/2023    CHOL 214 03/07/2022    CHOL 207 (H) 08/12/2020     Lab Results   Component Value Date    HDL 60 03/20/2023    HDL 57 03/07/2022    HDL 51 08/12/2020   No results found for: LDLCALC  No results found for: DLDL  Lab Results   Component Value Date    TRIG 57 03/20/2023    TRIG 84 03/07/2022    TRIG 126 08/12/2020    -slightly elevated lipid profile, however patient has made drastic lifestyle changes.  Previously 299, currently today noted to be  to 258 lb  -lengthy conversation had with patient regarding potential need to start statin preventatively if persistently elevated, however encouraged to continue  with lifestyle modification         Relevant Orders    TSH    Lipid Panel    Comprehensive Metabolic Panel    CBC Auto Differential    Vitamin D    Hemoglobin A1C       Endocrine    Class 2 obesity due to excess calories without serious comorbidity in adult (Chronic)      -BMI 37.08   -has made drastic weight loss over the past few years   -Encouraged continue routine aerobic exercise   - low-calorie low carb diet         Relevant Orders    TSH    Lipid Panel    Comprehensive Metabolic Panel    CBC Auto Differential    Vitamin D    Hemoglobin A1C       Orthopedic    Amputation, finger, traumatic (Chronic)      -s/p work-related injury   - left hand 2nd, 3rd, and 4th digit  amputations         Relevant Orders    TSH    Lipid Panel    Comprehensive Metabolic Panel    CBC Auto Differential    Vitamin D    Hemoglobin A1C       Other    Wellness examination - Primary     -patient feeling generally well today  -  Labs reviewed in generally stable, slightly elevated lipid profile  -age-appropriate screenings up-to-date  -immunizations up-to-date  -encourage routine aerobic exercise 2 to 3 times a week  -increase fluid hydration         Relevant Orders    TSH    Lipid Panel    Comprehensive Metabolic Panel    CBC Auto Differential    Vitamin D    Hemoglobin A1C          Follow up in about 1 year (around 3/22/2024) for Wellness with labs prior to visit (Dr KEY).   -plan specifics discussed above    Orders Placed This Encounter    TSH    Lipid Panel    Comprehensive Metabolic Panel    CBC Auto Differential    Vitamin D    Hemoglobin A1C        Medication List with Changes/Refills   Current Medications    BUPROPION (WELLBUTRIN XL) 150 MG TB24 TABLET    Take 1 tablet (150 mg total) by mouth once daily.    PROPRANOLOL (INDERAL) 10 MG TABLET    Take 2 tablets (20 mg total) by mouth 3 (three) times daily as needed (anxiety).

## 2023-03-22 NOTE — ASSESSMENT & PLAN NOTE
-patient feeling generally well today  -  Labs reviewed in generally stable, slightly elevated lipid profile  -age-appropriate screenings up-to-date  -immunizations up-to-date  -encourage routine aerobic exercise 2 to 3 times a week  -increase fluid hydration

## 2023-03-22 NOTE — ASSESSMENT & PLAN NOTE
Lab Results   Component Value Date    CHOL 216 (H) 03/20/2023    CHOL 214 03/07/2022    CHOL 207 (H) 08/12/2020     Lab Results   Component Value Date    HDL 60 03/20/2023    HDL 57 03/07/2022    HDL 51 08/12/2020     No results found for: LDLCALC  No results found for: DLDL  Lab Results   Component Value Date    TRIG 57 03/20/2023    TRIG 84 03/07/2022    TRIG 126 08/12/2020      -slightly elevated lipid profile, however patient has made drastic lifestyle changes.  Previously 299, currently today noted to be  to 258 lb  -lengthy conversation had with patient regarding potential need to start statin preventatively if persistently elevated, however encouraged to continue with lifestyle modification

## 2023-03-22 NOTE — ASSESSMENT & PLAN NOTE
-BMI 37.08   -has made drastic weight loss over the past few years   -Encouraged continue routine aerobic exercise   - low-calorie low carb diet

## 2023-06-26 PROBLEM — Z00.00 WELLNESS EXAMINATION: Status: RESOLVED | Noted: 2023-03-22 | Resolved: 2023-06-26

## 2023-11-27 ENCOUNTER — HOSPITAL ENCOUNTER (OUTPATIENT)
Dept: RADIOLOGY | Facility: HOSPITAL | Age: 33
Discharge: HOME | End: 2023-11-27
Attending: NURSE PRACTITIONER
Payer: COMMERCIAL

## 2023-11-27 DIAGNOSIS — M54.50 LUMBAGO: ICD-10-CM

## 2023-11-27 PROCEDURE — 72110 X-RAY EXAM L-2 SPINE 4/>VWS: CPT | Mod: TC

## 2024-03-21 ENCOUNTER — TELEPHONE (OUTPATIENT)
Dept: INTERNAL MEDICINE | Facility: CLINIC | Age: 34
End: 2024-03-21
Payer: COMMERCIAL